# Patient Record
Sex: MALE | Race: WHITE | NOT HISPANIC OR LATINO | Employment: UNEMPLOYED | URBAN - METROPOLITAN AREA
[De-identification: names, ages, dates, MRNs, and addresses within clinical notes are randomized per-mention and may not be internally consistent; named-entity substitution may affect disease eponyms.]

---

## 2022-04-11 ENCOUNTER — OFFICE VISIT (OUTPATIENT)
Dept: MEDICAL GROUP | Facility: PHYSICIAN GROUP | Age: 22
End: 2022-04-11
Payer: COMMERCIAL

## 2022-04-11 VITALS
DIASTOLIC BLOOD PRESSURE: 80 MMHG | OXYGEN SATURATION: 98 % | RESPIRATION RATE: 18 BRPM | HEIGHT: 72 IN | BODY MASS INDEX: 19.52 KG/M2 | TEMPERATURE: 98.7 F | SYSTOLIC BLOOD PRESSURE: 122 MMHG | HEART RATE: 74 BPM | WEIGHT: 144.13 LBS

## 2022-04-11 DIAGNOSIS — R06.02 SHORTNESS OF BREATH: ICD-10-CM

## 2022-04-11 DIAGNOSIS — Z00.00 ENCOUNTER FOR MEDICAL EXAMINATION TO ESTABLISH CARE: ICD-10-CM

## 2022-04-11 DIAGNOSIS — M79.641 PAIN IN BOTH HANDS: ICD-10-CM

## 2022-04-11 DIAGNOSIS — M79.642 PAIN IN BOTH HANDS: ICD-10-CM

## 2022-04-11 DIAGNOSIS — F33.2 SEVERE EPISODE OF RECURRENT MAJOR DEPRESSIVE DISORDER, WITHOUT PSYCHOTIC FEATURES (HCC): ICD-10-CM

## 2022-04-11 PROBLEM — F33.42 RECURRENT MAJOR DEPRESSIVE DISORDER, IN FULL REMISSION (HCC): Status: ACTIVE | Noted: 2022-04-11

## 2022-04-11 PROCEDURE — 99204 OFFICE O/P NEW MOD 45 MIN: CPT

## 2022-04-11 RX ORDER — BUPROPION HYDROCHLORIDE 150 MG/1
150 TABLET ORAL EVERY MORNING
Qty: 30 TABLET | Refills: 0 | Status: SHIPPED | OUTPATIENT
Start: 2022-04-11 | End: 2022-05-23 | Stop reason: SDUPTHER

## 2022-04-11 ASSESSMENT — PATIENT HEALTH QUESTIONNAIRE - PHQ9
CLINICAL INTERPRETATION OF PHQ2 SCORE: 5
5. POOR APPETITE OR OVEREATING: 0 - NOT AT ALL
SUM OF ALL RESPONSES TO PHQ QUESTIONS 1-9: 16

## 2022-04-11 NOTE — ASSESSMENT & PLAN NOTE
Patient reports that he has been struggling with right greater than left burning and throbbing pain in both of his wrists and palms intermittently over the last 5 years.  He does report that he uses the computer frequently and finds that his flareups occur after computer usage.  He feels that he may have a weak  strength as well.  He has not tried anything for his symptoms

## 2022-04-11 NOTE — PROGRESS NOTES
CC:    Chief Complaint   Patient presents with   • Establish Care   • Hand Pain     Burning and tender over 5 years    • Depression     Feels depressed.        HISTORY OF THE PRESENT ILLNESS: This is a pleasant 21 y.o. male presenting today to [facility], who wishes to discuss the following problems listed below:     Pain in both hands  Patient reports that he has been struggling with right greater than left burning and throbbing pain in both of his wrists and palms intermittently over the last 5 years.  He does report that he uses the computer frequently and finds that his flareups occur after computer usage.  He feels that he may have a weak  strength as well.  He has not tried anything for his symptoms    Severe episode of recurrent major depressive disorder, without psychotic features (HCC)  Patient reports that he has been struggling with depression for many years.  He was previously on citalopram for anxiety but is not sure if this was effective.  He has not been on this medication in quite some time.  He describes his symptoms as fogginess in the head, and issues with self-doubt and poor self-image.  He is dealing with social stressors with his parents wanting him to move out and stressed about finding a job as well as college.  He does have suicidal thoughts but no intent or plan to act on it.  He states his reasons is his family and lack of [employer] beliefs    No past medical history on file.    Allergies: Penicillins    Current Outpatient Medications   Medication Sig Dispense Refill   • buPROPion (WELLBUTRIN XL) 150 MG XL tablet Take 1 Tablet by mouth every morning. 30 Tablet 0     No current facility-administered medications for this visit.       ROS:     Constitutional: Patient denies any fever, chills, night sweats, weight loss/gain, fatigue, or malaise.   Eyes: Patient denies any photophobia, eye pain, diplopia, discharge, dryness, excessive tearing, redness, or VA changes.   ENT: Patient  denies any runny nose, hoarseness, sore throat, or dysphagia.   Resp: Patient denies cough, wheezing, or shortness of breath.   CV: Patient denies any chest pain, claudication, cyanosis, palpitations, or edema.   GI: Patient denies any constipation, nausea, vomiting, diarrhea, bloating, abdominal pain, or dyspepsia.   MSK: Patient denies any joint pain, cramps, swelling, weakness, stiffness, or tremor  Skin: Patient denies any color changes, skin changes, lesions, ulcerations, wounds, and pruritus, hair or nail changes.   Neuro: Patient denies any headache, numbness or tingling  Psych: Patient denies any suicidal or homicidal ideation, depression or anxiety.     Exam: /80 (BP Location: Right arm, Patient Position: Sitting, BP Cuff Size: Adult)   Pulse 74   Temp 37.1 °C (98.7 °F) (Temporal)   Resp 18   Ht 1.829 m (6')   Wt 65.4 kg (144 lb 2 oz)   SpO2 98%  Body mass index is 19.55 kg/m².    Gen: Alert and oriented x4. Well developed, well-nourished male in no apparent distress.  Skin: Warm, dry, good turgor, no rashes in visible areas or lacerations appreciated.   Eye: EOM intact, pupils equal, round and reactive, conjunctiva clear, lids normal.  Neck: Trachea midline, no masses, no thyromegaly  MSK: Normal gait, moves all extremities.  Neuro: Alert and oriented x 4, non-focal exam with motor and sensory grossly intact.  Ext: No clubbing, cyanosis, edema.  Psych: Normal behavior, affect and mood.    PHQ-9 Screening 4/11/2022   Little interest or pleasure in doing things 3 - nearly every day   Feeling down, depressed, or hopeless 2 - more than half the days   Trouble falling or staying asleep, or sleeping too much 2 - more than half the days   Feeling tired or having little energy 3 - nearly every day   Poor appetite or overeating 0 - not at all   Feeling bad about yourself - or that you are a failure or have let yourself or your family down 3 - nearly every day   Trouble concentrating on things, such as  reading the newspaper or watching television 1 - several days   Moving or speaking so slowly that other people could have noticed. Or the opposite - being so fidgety or restless that you have been moving around a lot more than usual 0 - not at all   Thoughts that you would be better off dead, or of hurting yourself in some way 2 - more than half the days   PHQ-2 Total Score 5   PHQ-9 Total Score 16       Interpretation of PHQ-9 Total Score   Score Severity   1-4 No Depression   5-9 Mild Depression   10-14 Moderate Depression   15-19 Moderately Severe Depression   20-27 Severe Depression      Assessment/Plan:    21 y.o. male with the followin. Severe episode of recurrent major depressive disorder, without psychotic features (HCC)  Chronic condition, uncontrolled.  Discussed possibility of starting medication today.  Patient is interested.  He reports that he struggles significantly with fatigue and is wanting an agent that is not known to have groggy side effects.  He is open to trying bupropion, as well as a referral to psychology.  Rule out thyroid is contributing factor  - TSH WITH REFLEX TO FT4; Future  - buPROPion (WELLBUTRIN XL) 150 MG XL tablet; Take 1 Tablet by mouth every morning.  Dispense: 30 Tablet; Refill: 0  - Referral to Psychology    2. Encounter for medical examination to establish care  - CBC WITHOUT DIFFERENTIAL; Future  - Comp Metabolic Panel; Future  - VITAMIN D,25 HYDROXY; Future    3. Pain in both hands  Chronic condition.  Recommend patient try over-the-counter ibuprofen for pain flareups and use bilateral wrist braces at night.  Suspect that he may have a component of carpal tunnel syndrome.  If not improved with wrist bracing and anti-inflammatories, consider EMG at next visit    4. Shortness of breath  Chronic condition.  Patient reports that he has noticed some episodes of shortness of breath, but is not sure if it is due to himself overthinking the episodes.  Recommend the patient  start a symptom diary, suspect that it is likely related to underlying anxiety.  However, his sister did recently get diagnosed with asthma.  Patient will keep a symptom diary and follow-up in 1 month    Follow-up: Return in about 4 weeks (around 5/9/2022) for recheck meds and labs .    Health Maintenance: Completed    I have placed the below orders and discussed them with an approved delegating provider.  The MA is performing the below orders under the direction of Doris Simon MD.    Please note that this dictation was created using voice recognition software. I have made every reasonable attempt to correct obvious errors, but I expect that there are errors of grammar and possibly content that I did not discover before finalizing the note.    Electronically signed by KIERAN Apodaca on April 11, 2022

## 2022-04-11 NOTE — ASSESSMENT & PLAN NOTE
Patient reports that he has been struggling with depression for many years.  He was previously on citalopram for anxiety but is not sure if this was effective.  He has not been on this medication in quite some time.  He describes his symptoms as fogginess in the head, and issues with self-doubt and poor self-image.  He is dealing with social stressors with his parents wanting him to move out and stressed about finding a job as well as college.  He does have suicidal thoughts but no intent or plan to act on it.  He states his reasons is his family and lack of Yaz[employer]ism beliefs

## 2022-05-23 DIAGNOSIS — F33.2 SEVERE EPISODE OF RECURRENT MAJOR DEPRESSIVE DISORDER, WITHOUT PSYCHOTIC FEATURES (HCC): ICD-10-CM

## 2022-05-23 RX ORDER — BUPROPION HYDROCHLORIDE 150 MG/1
150 TABLET ORAL EVERY MORNING
Qty: 30 TABLET | Refills: 0 | Status: SHIPPED | OUTPATIENT
Start: 2022-05-23 | End: 2023-03-22

## 2022-09-12 ENCOUNTER — OFFICE VISIT (OUTPATIENT)
Dept: MEDICAL GROUP | Facility: PHYSICIAN GROUP | Age: 22
End: 2022-09-12
Payer: COMMERCIAL

## 2022-09-12 ENCOUNTER — HOSPITAL ENCOUNTER (OUTPATIENT)
Dept: LAB | Facility: MEDICAL CENTER | Age: 22
End: 2022-09-12
Payer: COMMERCIAL

## 2022-09-12 VITALS
RESPIRATION RATE: 18 BRPM | HEIGHT: 72 IN | SYSTOLIC BLOOD PRESSURE: 118 MMHG | TEMPERATURE: 98.8 F | WEIGHT: 149 LBS | OXYGEN SATURATION: 98 % | HEART RATE: 91 BPM | BODY MASS INDEX: 20.18 KG/M2 | DIASTOLIC BLOOD PRESSURE: 80 MMHG

## 2022-09-12 DIAGNOSIS — M79.641 PAIN IN BOTH HANDS: ICD-10-CM

## 2022-09-12 DIAGNOSIS — F41.0 PANIC ANXIETY SYNDROME: ICD-10-CM

## 2022-09-12 DIAGNOSIS — R20.2 NUMBNESS AND TINGLING IN BOTH HANDS: ICD-10-CM

## 2022-09-12 DIAGNOSIS — F33.2 SEVERE EPISODE OF RECURRENT MAJOR DEPRESSIVE DISORDER, WITHOUT PSYCHOTIC FEATURES (HCC): ICD-10-CM

## 2022-09-12 DIAGNOSIS — K21.9 GASTROESOPHAGEAL REFLUX DISEASE WITHOUT ESOPHAGITIS: ICD-10-CM

## 2022-09-12 DIAGNOSIS — R20.0 NUMBNESS AND TINGLING IN BOTH HANDS: ICD-10-CM

## 2022-09-12 DIAGNOSIS — Z00.00 ENCOUNTER FOR MEDICAL EXAMINATION TO ESTABLISH CARE: ICD-10-CM

## 2022-09-12 DIAGNOSIS — M79.642 PAIN IN BOTH HANDS: ICD-10-CM

## 2022-09-12 LAB
25(OH)D3 SERPL-MCNC: 35 NG/ML (ref 30–100)
ALBUMIN SERPL BCP-MCNC: 4.9 G/DL (ref 3.2–4.9)
ALBUMIN/GLOB SERPL: 2.1 G/DL
ALP SERPL-CCNC: 49 U/L (ref 30–99)
ALT SERPL-CCNC: 10 U/L (ref 2–50)
ANION GAP SERPL CALC-SCNC: 10 MMOL/L (ref 7–16)
AST SERPL-CCNC: 16 U/L (ref 12–45)
BILIRUB SERPL-MCNC: 0.3 MG/DL (ref 0.1–1.5)
BUN SERPL-MCNC: 24 MG/DL (ref 8–22)
CALCIUM SERPL-MCNC: 9.5 MG/DL (ref 8.5–10.5)
CHLORIDE SERPL-SCNC: 105 MMOL/L (ref 96–112)
CO2 SERPL-SCNC: 26 MMOL/L (ref 20–33)
CREAT SERPL-MCNC: 1.07 MG/DL (ref 0.5–1.4)
ERYTHROCYTE [DISTWIDTH] IN BLOOD BY AUTOMATED COUNT: 42.7 FL (ref 35.9–50)
GFR SERPLBLD CREATININE-BSD FMLA CKD-EPI: 100 ML/MIN/1.73 M 2
GLOBULIN SER CALC-MCNC: 2.3 G/DL (ref 1.9–3.5)
GLUCOSE SERPL-MCNC: 88 MG/DL (ref 65–99)
HCT VFR BLD AUTO: 49.5 % (ref 42–52)
HGB BLD-MCNC: 17.3 G/DL (ref 14–18)
MCH RBC QN AUTO: 31.1 PG (ref 27–33)
MCHC RBC AUTO-ENTMCNC: 34.9 G/DL (ref 33.7–35.3)
MCV RBC AUTO: 88.9 FL (ref 81.4–97.8)
PLATELET # BLD AUTO: 226 K/UL (ref 164–446)
PMV BLD AUTO: 10.6 FL (ref 9–12.9)
POTASSIUM SERPL-SCNC: 4.7 MMOL/L (ref 3.6–5.5)
PROT SERPL-MCNC: 7.2 G/DL (ref 6–8.2)
RBC # BLD AUTO: 5.57 M/UL (ref 4.7–6.1)
SODIUM SERPL-SCNC: 141 MMOL/L (ref 135–145)
TSH SERPL DL<=0.005 MIU/L-ACNC: 2.38 UIU/ML (ref 0.38–5.33)
WBC # BLD AUTO: 9.6 K/UL (ref 4.8–10.8)

## 2022-09-12 PROCEDURE — 80053 COMPREHEN METABOLIC PANEL: CPT

## 2022-09-12 PROCEDURE — 82306 VITAMIN D 25 HYDROXY: CPT

## 2022-09-12 PROCEDURE — 99214 OFFICE O/P EST MOD 30 MIN: CPT

## 2022-09-12 PROCEDURE — 85027 COMPLETE CBC AUTOMATED: CPT

## 2022-09-12 PROCEDURE — 36415 COLL VENOUS BLD VENIPUNCTURE: CPT

## 2022-09-12 PROCEDURE — 84443 ASSAY THYROID STIM HORMONE: CPT

## 2022-09-12 RX ORDER — BUSPIRONE HYDROCHLORIDE 10 MG/1
10 TABLET ORAL 2 TIMES DAILY
Qty: 60 TABLET | Refills: 3 | Status: SHIPPED | OUTPATIENT
Start: 2022-09-12

## 2022-09-12 RX ORDER — SERTRALINE HYDROCHLORIDE 25 MG/1
25 TABLET, FILM COATED ORAL DAILY
Qty: 30 TABLET | Refills: 11 | Status: SHIPPED
Start: 2022-09-12 | End: 2023-03-22

## 2022-09-12 RX ORDER — OMEPRAZOLE 20 MG/1
20 CAPSULE, DELAYED RELEASE ORAL DAILY
Qty: 60 CAPSULE | Refills: 0 | Status: SHIPPED | OUTPATIENT
Start: 2022-09-12 | End: 2023-04-04

## 2022-09-12 NOTE — ASSESSMENT & PLAN NOTE
Last visit, patient was started on Wellbutrin for management of his depression and anxiety.  He reports that unfortunately the Wellbutrin did make his anxiety worse and he has been experiencing panic attacks more frequently.  He has self discontinued the Wellbutrin.  He is interested in trying another medication

## 2022-09-12 NOTE — PROGRESS NOTES
CC:   Chief Complaint   Patient presents with    Follow-Up     medication        HISTORY OF PRESENT ILLNESS: Patient is a 21 y.o. male established patient who presents today to discuss the following problems below:     Severe episode of recurrent major depressive disorder, without psychotic features (HCC)  Last visit, patient was started on Wellbutrin for management of his depression and anxiety.  He reports that unfortunately the Wellbutrin did make his anxiety worse and he has been experiencing panic attacks more frequently.  He has self discontinued the Wellbutrin.  He is interested in trying another medication     Pain in both hands  Patient does continue to have pain in his bilateral hands and wrists.  He reports that when the pain occurs he does feel like his palms turn somewhat red.  He is concerned about possibly having Raynaud's, however the episodes are not triggered by cold and his fingers do not change color.  He is unsure if ibuprofen helped with his symptoms as he reports that the episodes only last for 5 to 10 minutes at a time, and ibuprofen usually takes 45 minutes to an hour or to work.    Gastroesophageal reflux disease without esophagitis  Patient reports that he has a history of reflux.  He was on omeprazole several years ago, but has not been on this medication for quite some time.  He is interested in restarting this      History reviewed. No pertinent past medical history.    Allergies:Penicillins    Review of Systems: Otherwise negative except for as stated above.      Exam: /80 (BP Location: Right arm, Patient Position: Sitting, BP Cuff Size: Adult)   Pulse 91   Temp 37.1 °C (98.8 °F) (Temporal)   Resp 18   Ht 1.829 m (6')   Wt 67.6 kg (149 lb)   SpO2 98%  Body mass index is 20.21 kg/m².    Gen: Alert and oriented x4. Well developed, well-nourished male in no apparent distress.  Skin: Warm, dry, good turgor, no rashes in visible areas or lacerations appreciated.   Eye: EOM  intact, pupils equal, round and reactive, conjunctiva clear, lids normal.  Neck: Trachea midline, no masses, no thyromegaly  GI:  Soft, non-tender abdomen with no distention.   MSK: Normal gait, moves all extremities.  Neuro: Alert and oriented x 4, non-focal exam with motor and sensory grossly intact.  Ext: No clubbing, cyanosis, edema.  Psych: Normal behavior, affect and mood.      Assessment/Plan:  21 y.o. male with the following -    1. Severe episode of recurrent major depressive disorder, without psychotic features (HCC)  Chronic condition, uncontrolled.  Patient has been off of Wellbutrin for 2 weeks.  Discussed trial of sertraline today.    Follow-up in 1 month.    Return to clinic sooner for symptoms including but not limited to: worsening depression, suicidal ideation, anxiety, agitation, panic attacks, insomnia, irritability, hostility, aggressiveness, impulsivity, hypomania and nasra    If such symptoms are observed, you or family need to contact us immediately, and consider calling the [employer] Suicide Prevention Lifeline (1769.770.1368) or 192, or transporting patient to the emergency department for immediate evaluation.     SSRI Black Box Warnings include: Anxiety, agitation, panic attacks, insomnia, irritability, hostility, aggressiveness, impulsivity, hypomania and nasra have been reported in adult and pediatric patients being treated with SSRI for major depressive disorder as well as for other indications.    - sertraline (ZOLOFT) 25 MG tablet; Take 1 Tablet by mouth every day.  Dispense: 30 Tablet; Refill: 11    2. Panic anxiety syndrome  Acute condition, not at goal.  BuSpar given for breakthrough anxiety and panic attacks.  - busPIRone (BUSPAR) 10 MG Tab tablet; Take 1 Tablet by mouth 2 times a day.  Dispense: 60 Tablet; Refill: 3    3. Gastroesophageal reflux disease without esophagitis  Chronic condition, not at baseline.  Restarted on omeprazole 20 mg daily  - omeprazole (PRILOSEC) 20 MG  delayed-release capsule; Take 1 Capsule by mouth every day.  Dispense: 60 Capsule; Refill: 0    4. Numbness and tingling in both hands  Chronic condition.  Discussed with patient that I am more suspicious of a carpal tunnel syndrome, particularly in the setting of frequent keyboard use.  Obtain EMG  - Referral to Neurodiagnostics (EEG,EP,EMG/NCS/DBS)      Follow-up: Return in about 4 weeks (around 10/10/2022).    Health Maintenance: Completed      Please note that this dictation was created using voice recognition software. I have made every reasonable attempt to correct obvious errors, but I expect that there are errors of grammar and possibly content that I did not discover before finalizing the note.    Electronically signed by KIERAN Apodaca on September 12, 2022

## 2022-09-12 NOTE — ASSESSMENT & PLAN NOTE
Patient reports that he has a history of reflux.  He was on omeprazole several years ago, but has not been on this medication for quite some time.  He is interested in restarting this

## 2022-09-12 NOTE — ASSESSMENT & PLAN NOTE
Patient does continue to have pain in his bilateral hands and wrists.  He reports that when the pain occurs he does feel like his palms turn somewhat red.  He is concerned about possibly having Raynaud's, however the episodes are not triggered by cold and his fingers do not change color.  He is unsure if ibuprofen helped with his symptoms as he reports that the episodes only last for 5 to 10 minutes at a time, and ibuprofen usually takes 45 minutes to an hour or to work.

## 2023-03-22 ENCOUNTER — OFFICE VISIT (OUTPATIENT)
Dept: MEDICAL GROUP | Facility: PHYSICIAN GROUP | Age: 23
End: 2023-03-22
Payer: COMMERCIAL

## 2023-03-22 VITALS
BODY MASS INDEX: 19.54 KG/M2 | DIASTOLIC BLOOD PRESSURE: 76 MMHG | RESPIRATION RATE: 16 BRPM | TEMPERATURE: 98.8 F | SYSTOLIC BLOOD PRESSURE: 116 MMHG | HEIGHT: 72 IN | WEIGHT: 144.3 LBS | OXYGEN SATURATION: 96 % | HEART RATE: 88 BPM

## 2023-03-22 DIAGNOSIS — M79.641 PAIN IN BOTH HANDS: ICD-10-CM

## 2023-03-22 DIAGNOSIS — M79.642 PAIN IN BOTH HANDS: ICD-10-CM

## 2023-03-22 DIAGNOSIS — F33.2 SEVERE EPISODE OF RECURRENT MAJOR DEPRESSIVE DISORDER, WITHOUT PSYCHOTIC FEATURES (HCC): ICD-10-CM

## 2023-03-22 PROCEDURE — 99214 OFFICE O/P EST MOD 30 MIN: CPT

## 2023-03-22 RX ORDER — NAPROXEN 500 MG/1
500 TABLET ORAL 2 TIMES DAILY WITH MEALS
Qty: 60 TABLET | Refills: 0 | Status: SHIPPED | OUTPATIENT
Start: 2023-03-22

## 2023-03-22 ASSESSMENT — PATIENT HEALTH QUESTIONNAIRE - PHQ9
5. POOR APPETITE OR OVEREATING: NOT AT ALL
6. FEELING BAD ABOUT YOURSELF - OR THAT YOU ARE A FAILURE OR HAVE LET YOURSELF OR YOUR FAMILY DOWN: SEVERAL DAYS
3. TROUBLE FALLING OR STAYING ASLEEP OR SLEEPING TOO MUCH: SEVERAL DAYS
8. MOVING OR SPEAKING SO SLOWLY THAT OTHER PEOPLE COULD HAVE NOTICED. OR THE OPPOSITE, BEING SO FIGETY OR RESTLESS THAT YOU HAVE BEEN MOVING AROUND A LOT MORE THAN USUAL: NOT AT ALL
2. FEELING DOWN, DEPRESSED, IRRITABLE, OR HOPELESS: SEVERAL DAYS
9. THOUGHTS THAT YOU WOULD BE BETTER OFF DEAD, OR OF HURTING YOURSELF: NOT AT ALL
1. LITTLE INTEREST OR PLEASURE IN DOING THINGS: SEVERAL DAYS
SUM OF ALL RESPONSES TO PHQ QUESTIONS 1-9: 5
4. FEELING TIRED OR HAVING LITTLE ENERGY: SEVERAL DAYS
7. TROUBLE CONCENTRATING ON THINGS, SUCH AS READING THE NEWSPAPER OR WATCHING TELEVISION: NOT AT ALL
SUM OF ALL RESPONSES TO PHQ9 QUESTIONS 1 AND 2: 2

## 2023-03-22 ASSESSMENT — FIBROSIS 4 INDEX: FIB4 SCORE: 0.51

## 2023-03-22 NOTE — ASSESSMENT & PLAN NOTE
Last visit, patient was started on sertraline 25 mg daily for management of severe major depressive disorder.  He was also given buspirone 10 mg twice daily for breakthrough episodes of anxiety as well. He reports that the sertraline was initially working well, but he is going to have to move out soon and his anxiety is worsened. He has not used the buspirone for breakthrough.

## 2023-03-22 NOTE — PROGRESS NOTES
CC:   Chief Complaint   Patient presents with    Tingling     Feels a tingling on both hands when using computer, and on feet when in the shower        HISTORY OF PRESENT ILLNESS: Patient is a 22 y.o. male established patient who presents today to discuss the following problems below:     Severe episode of recurrent major depressive disorder, without psychotic features (HCC)  Last visit, patient was started on sertraline 25 mg daily for management of severe major depressive disorder.  He was also given buspirone 10 mg twice daily for breakthrough episodes of anxiety as well. He reports that the sertraline was initially working well, but he is going to have to move out soon and his anxiety is worsened. He has not used the buspirone for breakthrough.     Pain in both hands  Last visit, patient was suspected to have carpal tunnel syndrome, an EMG was ordered for further evaluation. He did not get the EMG done due to finances. He reports that when he uses the splints, his pain gets worse during the activity. He reports that there was a time when his pain was much worse using the computer, but he took a break and his pain is more back to baseline. He notices that he also has some burning sensations in his feet as well, primarily when his feet touch hot water in the shower, or when exposed to a heater. He is not currently using any antiinflammatories.     Review of Systems: Otherwise negative except for as stated above.      Exam: /76 (BP Location: Left arm, Patient Position: Sitting, BP Cuff Size: Adult)   Pulse 88   Temp 37.1 °C (98.8 °F) (Temporal)   Resp 16   Ht 1.829 m (6')   Wt 65.5 kg (144 lb 4.8 oz)   SpO2 96%  Body mass index is 19.57 kg/m².    Physical Exam  Constitutional:       Appearance: Normal appearance.   Cardiovascular:      Rate and Rhythm: Normal rate and regular rhythm.      Heart sounds: Normal heart sounds.   Pulmonary:      Effort: Pulmonary effort is normal.      Breath sounds: Normal  breath sounds.   Musculoskeletal:      Cervical back: Normal range of motion and neck supple.   Lymphadenopathy:      Cervical: No cervical adenopathy.   Neurological:      General: No focal deficit present.      Mental Status: He is alert and oriented to person, place, and time.       Assessment/Plan:  22 y.o. male with the following -    1. Severe episode of recurrent major depressive disorder, without psychotic features (HCC)  Chronic, not at goal. Increase zoloft to 50mg daily. Patient was encouraged to use buspirone 10mg as needed for episodes of panic or anxiety  - sertraline (ZOLOFT) 50 MG Tab; Take 1 Tablet by mouth every day.  Dispense: 90 Tablet; Refill: 1    2. Pain in both hands  Chronic, not at goal. Patient will complete the EMG and wear splints at nighttime, naproxen for pain as needed. Advised not to combine with other NSAID products.   - naproxen (NAPROSYN) 500 MG Tab; Take 1 Tablet by mouth 2 times a day with meals.  Dispense: 60 Tablet; Refill: 0      Follow-up: Return for after EMG.    Health Maintenance: Completed      Please note that this dictation was created using voice recognition software. I have made every reasonable attempt to correct obvious errors, but I expect that there are errors of grammar and possibly content that I did not discover before finalizing the note.    Electronically signed by KIERAN Apodaca on March 22, 2023

## 2023-03-22 NOTE — ASSESSMENT & PLAN NOTE
Last visit, patient was suspected to have carpal tunnel syndrome, an EMG was ordered for further evaluation. He did not get the EMG done due to finances. He reports that when he uses the splints, his pain gets worse during the activity. He reports that there was a time when his pain was much worse using the computer, but he took a break and his pain is more back to baseline. He notices that he also has some burning sensations in his feet as well, primarily when his feet touch hot water in the shower, or when exposed to a heater. He is not currently using any antiinflammatories.

## 2023-03-31 DIAGNOSIS — K21.9 GASTROESOPHAGEAL REFLUX DISEASE WITHOUT ESOPHAGITIS: ICD-10-CM

## 2023-04-04 DIAGNOSIS — K21.9 GASTROESOPHAGEAL REFLUX DISEASE WITHOUT ESOPHAGITIS: ICD-10-CM

## 2023-04-04 RX ORDER — OMEPRAZOLE 20 MG/1
20 CAPSULE, DELAYED RELEASE ORAL DAILY
Qty: 90 CAPSULE | Refills: 3 | OUTPATIENT
Start: 2023-04-04

## 2023-04-04 RX ORDER — OMEPRAZOLE 20 MG/1
CAPSULE, DELAYED RELEASE ORAL
Qty: 60 CAPSULE | Refills: 0 | Status: SHIPPED | OUTPATIENT
Start: 2023-04-04

## 2024-02-03 DIAGNOSIS — F33.2 SEVERE EPISODE OF RECURRENT MAJOR DEPRESSIVE DISORDER, WITHOUT PSYCHOTIC FEATURES (HCC): ICD-10-CM

## 2024-02-05 NOTE — TELEPHONE ENCOUNTER
Received request via: Patient    Was the patient seen in the last year in this department? Yes    Does the patient have an active prescription (recently filled or refills available) for medication(s) requested? No    Pharmacy Name: [id number] - [place], [place] - [address].     Does the patient have prison Plus and need 100 day supply (blood pressure, diabetes and cholesterol meds only)? Patient does not have SCP

## 2024-02-07 ENCOUNTER — APPOINTMENT (OUTPATIENT)
Dept: MEDICAL GROUP | Facility: PHYSICIAN GROUP | Age: 24
End: 2024-02-07
Payer: COMMERCIAL

## 2024-03-05 ENCOUNTER — APPOINTMENT (OUTPATIENT)
Dept: MEDICAL GROUP | Facility: PHYSICIAN GROUP | Age: 24
End: 2024-03-05
Payer: COMMERCIAL

## 2024-03-07 ENCOUNTER — OFFICE VISIT (OUTPATIENT)
Dept: MEDICAL GROUP | Facility: PHYSICIAN GROUP | Age: 24
End: 2024-03-07
Payer: COMMERCIAL

## 2024-03-07 VITALS
TEMPERATURE: 99.4 F | BODY MASS INDEX: 21.4 KG/M2 | DIASTOLIC BLOOD PRESSURE: 72 MMHG | WEIGHT: 158 LBS | OXYGEN SATURATION: 97 % | HEART RATE: 84 BPM | RESPIRATION RATE: 16 BRPM | SYSTOLIC BLOOD PRESSURE: 118 MMHG | HEIGHT: 72 IN

## 2024-03-07 DIAGNOSIS — M79.641 PAIN IN BOTH HANDS: ICD-10-CM

## 2024-03-07 DIAGNOSIS — F33.1 DEPRESSION, MAJOR, RECURRENT, MODERATE (HCC): ICD-10-CM

## 2024-03-07 DIAGNOSIS — M79.642 PAIN IN BOTH HANDS: ICD-10-CM

## 2024-03-07 PROCEDURE — 3074F SYST BP LT 130 MM HG: CPT | Performed by: FAMILY MEDICINE

## 2024-03-07 PROCEDURE — 99213 OFFICE O/P EST LOW 20 MIN: CPT | Performed by: FAMILY MEDICINE

## 2024-03-07 PROCEDURE — 3078F DIAST BP <80 MM HG: CPT | Performed by: FAMILY MEDICINE

## 2024-03-07 RX ORDER — DULOXETIN HYDROCHLORIDE 30 MG/1
30 CAPSULE, DELAYED RELEASE ORAL DAILY
Qty: 30 CAPSULE | Refills: 1 | Status: SHIPPED | OUTPATIENT
Start: 2024-03-07

## 2024-03-07 ASSESSMENT — FIBROSIS 4 INDEX: FIB4 SCORE: 0.54

## 2024-03-07 ASSESSMENT — PATIENT HEALTH QUESTIONNAIRE - PHQ9
5. POOR APPETITE OR OVEREATING: 0 - NOT AT ALL
SUM OF ALL RESPONSES TO PHQ QUESTIONS 1-9: 10
CLINICAL INTERPRETATION OF PHQ2 SCORE: 4

## 2024-03-08 NOTE — ASSESSMENT & PLAN NOTE
This is a chronic problem.  Patient states that he did not feel like the sertraline was doing much good and that he was having some problems with burning down his esophagus after taking it to 50 mg dose.  He stopped it about a week ago.  He is asking if there is something else he can take for his feeling of depression.  He is not suicidal.

## 2024-03-08 NOTE — PROGRESS NOTES
Subjective:     CC: Here for couple of issues as his PCP was not available today.    HPI:   Ugo presents today with the following medical concerns:    Depression, major, recurrent, moderate (HCC)  This is a chronic problem.  Patient states that he did not feel like the sertraline was doing much good and that he was having some problems with burning down his esophagus after taking it to 50 mg dose.  He stopped it about a week ago.  He is asking if there is something else he can take for his feeling of depression.  He is not suicidal.    Pain in both hands  This is a chronic problem.  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  He states has been having troubles mostly in his wrists.  There are no paresthesias or pain down into his fingers or hand.  He tried a wrist brace but that seemed to make it worse.  He was unable to take over-the-counter NSAIDs since they bother his stomach.    History reviewed. No pertinent past medical history.    Social History     Tobacco Use    Smoking status: Never    Smokeless tobacco: Never   Vaping Use    Vaping Use: Never used   Substance Use Topics    Alcohol use: Yes     Comment: occ    Drug use: Never       Current Outpatient Medications Ordered in Epic   Medication Sig Dispense Refill    DULoxetine (CYMBALTA) 30 MG Cap DR Particles Take 1 Capsule by mouth every day. 30 Capsule 1    omeprazole (PRILOSEC) 20 MG delayed-release capsule TAKE ONE CAPSULE BY MOUTH EVERY DAY 60 Capsule 0    naproxen (NAPROSYN) 500 MG Tab Take 1 Tablet by mouth 2 times a day with meals. 60 Tablet 0    busPIRone (BUSPAR) 10 MG Tab tablet Take 1 Tablet by mouth 2 times a day. 60 Tablet 3     No current Epic-ordered facility-administered medications on file.       Allergies:  Penicillins    Health Maintenance: Completed    ROS:  Gen: no fevers/chills, no changes in weight  Eyes: no changes in vision  ENT: no sore throat, no hearing loss, no bloody nose  Pulm: no sob, no  cough  CV: no chest pain, no palpitations  GI: no nausea/vomiting, no diarrhea  : no dysuria  Skin: no rash  Neuro: no headaches, no numbness/tingling  Heme/Lymph: no easy bruising      Objective:       Exam:  /72 (BP Location: Right arm, Patient Position: Sitting, BP Cuff Size: Adult)   Pulse 84   Temp 37.4 °C (99.4 °F) (Temporal)   Resp 16   Ht 1.829 m (6')   Wt 71.7 kg (158 lb)   SpO2 97%   BMI 21.43 kg/m²  Body mass index is 21.43 kg/m².    Gen: Alert and oriented, No apparent distress.  Ext: No clubbing, cyanosis, edema.  He has normal range of motion of his fingers and wrist without discomfort.  No muscle atrophy seen.  No paresthesias in his fingers.  Psych: Patient is alert and cooperative.  No unusual thought process expressed.  Insight and judgment is good.  Initially eye contact was not very good but improved as the visit went on.  He does not appear to be anxious.        Assessment & Plan:     23 y.o. male with the following -     1. Depression, major, recurrent, moderate (HCC)  This is a chronic problem.  I discussed with him the various medications that are available and what we can try.  Since he is expressing some problems with morning fatigue and apathy will try him on duloxetine.  He was warned that some people can get some mild nausea for the first week.  He is to report back in 1 to 2 weeks as the dose may need to be increased.  Also will do a referral to psychology for counseling for him.  And then he should follow-up with his PCP in the next month.  - Patient has been identified as having a positive depression screening. Appropriate orders and counseling have been given.    2. Pain in both hands  This is a chronic problem.  I told him it sounds like it is more tendinitis and may be overuse syndrome.  He should try some Voltaren gel to his wrists and see if that is beneficial.  If it starts to get better then he should do forearm exercises and these were demonstrated to him.  If  the symptoms persist that he could go to either physical therapy or orthopedics.      No follow-ups on file.    Please note that this dictation was created using voice recognition software. I have made every reasonable attempt to correct obvious errors, but I expect that there are errors of grammar and possibly content that I did not discover before finalizing the note.

## 2024-03-08 NOTE — ASSESSMENT & PLAN NOTE
This is a chronic problem.  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  He states has been having troubles mostly in his wrists.  There are no paresthesias or pain down into his fingers or hand.  He tried a wrist brace but that seemed to make it worse.  He was unable to take over-the-counter NSAIDs since they bother his stomach.

## 2024-04-05 DIAGNOSIS — K21.9 GASTROESOPHAGEAL REFLUX DISEASE WITHOUT ESOPHAGITIS: ICD-10-CM

## 2024-04-05 NOTE — TELEPHONE ENCOUNTER
Received request via: Pharmacy    Was the patient seen in the last year in this department? Yes    Does the patient have an active prescription (recently filled or refills available) for medication(s) requested? No    [facility] Name[facility]    Does the patient have residential Plus and need 100 day supply (blood pressure, diabetes and cholesterol meds only)? Patient does not have SCP

## 2024-04-08 RX ORDER — OMEPRAZOLE 20 MG/1
CAPSULE, DELAYED RELEASE ORAL
Qty: 60 CAPSULE | Refills: 0 | Status: SHIPPED | OUTPATIENT
Start: 2024-04-08

## 2025-02-25 ENCOUNTER — OFFICE VISIT (OUTPATIENT)
Dept: MEDICAL GROUP | Facility: PHYSICIAN GROUP | Age: 25
End: 2025-02-25
Payer: COMMERCIAL

## 2025-02-25 ENCOUNTER — HOSPITAL ENCOUNTER (OUTPATIENT)
Dept: LAB | Facility: MEDICAL CENTER | Age: 25
End: 2025-02-25
Payer: COMMERCIAL

## 2025-02-25 VITALS
RESPIRATION RATE: 7 BRPM | WEIGHT: 155.2 LBS | HEIGHT: 72 IN | HEART RATE: 92 BPM | SYSTOLIC BLOOD PRESSURE: 108 MMHG | OXYGEN SATURATION: 96 % | TEMPERATURE: 97.4 F | BODY MASS INDEX: 21.02 KG/M2 | DIASTOLIC BLOOD PRESSURE: 78 MMHG

## 2025-02-25 DIAGNOSIS — Z13.21 SCREENING FOR ENDOCRINE, NUTRITIONAL, METABOLIC AND IMMUNITY DISORDER: ICD-10-CM

## 2025-02-25 DIAGNOSIS — F33.1 DEPRESSION, MAJOR, RECURRENT, MODERATE (HCC): ICD-10-CM

## 2025-02-25 DIAGNOSIS — Z13.228 SCREENING FOR ENDOCRINE, NUTRITIONAL, METABOLIC AND IMMUNITY DISORDER: ICD-10-CM

## 2025-02-25 DIAGNOSIS — R53.83 OTHER FATIGUE: ICD-10-CM

## 2025-02-25 DIAGNOSIS — Z13.29 SCREENING FOR ENDOCRINE, NUTRITIONAL, METABOLIC AND IMMUNITY DISORDER: ICD-10-CM

## 2025-02-25 DIAGNOSIS — Z23 NEED FOR VACCINATION: ICD-10-CM

## 2025-02-25 DIAGNOSIS — Z13.0 SCREENING FOR ENDOCRINE, NUTRITIONAL, METABOLIC AND IMMUNITY DISORDER: ICD-10-CM

## 2025-02-25 LAB
25(OH)D3 SERPL-MCNC: 18 NG/ML (ref 30–100)
ALBUMIN SERPL BCP-MCNC: 4.7 G/DL (ref 3.2–4.9)
ALBUMIN/GLOB SERPL: 1.8 G/DL
ALP SERPL-CCNC: 56 U/L (ref 30–99)
ALT SERPL-CCNC: 18 U/L (ref 2–50)
ANION GAP SERPL CALC-SCNC: 11 MMOL/L (ref 7–16)
AST SERPL-CCNC: 19 U/L (ref 12–45)
BILIRUB SERPL-MCNC: 0.4 MG/DL (ref 0.1–1.5)
BUN SERPL-MCNC: 18 MG/DL (ref 8–22)
CALCIUM ALBUM COR SERPL-MCNC: 9 MG/DL (ref 8.5–10.5)
CALCIUM SERPL-MCNC: 9.6 MG/DL (ref 8.5–10.5)
CHLORIDE SERPL-SCNC: 106 MMOL/L (ref 96–112)
CHOLEST SERPL-MCNC: 169 MG/DL (ref 100–199)
CO2 SERPL-SCNC: 26 MMOL/L (ref 20–33)
CREAT SERPL-MCNC: 0.88 MG/DL (ref 0.5–1.4)
CRP SERPL HS-MCNC: <0.3 MG/DL (ref 0–0.75)
ERYTHROCYTE [DISTWIDTH] IN BLOOD BY AUTOMATED COUNT: 45.2 FL (ref 35.9–50)
ERYTHROCYTE [SEDIMENTATION RATE] IN BLOOD BY WESTERGREN METHOD: 2 MM/HOUR (ref 0–20)
FERRITIN SERPL-MCNC: 83.5 NG/ML (ref 22–322)
GFR SERPLBLD CREATININE-BSD FMLA CKD-EPI: 122 ML/MIN/1.73 M 2
GLOBULIN SER CALC-MCNC: 2.6 G/DL (ref 1.9–3.5)
GLUCOSE SERPL-MCNC: 96 MG/DL (ref 65–99)
HCT VFR BLD AUTO: 50 % (ref 42–52)
HDLC SERPL-MCNC: 43 MG/DL
HGB BLD-MCNC: 16.8 G/DL (ref 14–18)
IRON SERPL-MCNC: 59 UG/DL (ref 50–180)
LDLC SERPL CALC-MCNC: 89 MG/DL
MCH RBC QN AUTO: 30.4 PG (ref 27–33)
MCHC RBC AUTO-ENTMCNC: 33.6 G/DL (ref 32.3–36.5)
MCV RBC AUTO: 90.4 FL (ref 81.4–97.8)
PLATELET # BLD AUTO: 219 K/UL (ref 164–446)
PMV BLD AUTO: 10.1 FL (ref 9–12.9)
POTASSIUM SERPL-SCNC: 4.8 MMOL/L (ref 3.6–5.5)
PROT SERPL-MCNC: 7.3 G/DL (ref 6–8.2)
RBC # BLD AUTO: 5.53 M/UL (ref 4.7–6.1)
SODIUM SERPL-SCNC: 143 MMOL/L (ref 135–145)
TRIGL SERPL-MCNC: 185 MG/DL (ref 0–149)
TSH SERPL DL<=0.005 MIU/L-ACNC: 1.04 UIU/ML (ref 0.38–5.33)
WBC # BLD AUTO: 7.9 K/UL (ref 4.8–10.8)

## 2025-02-25 PROCEDURE — 80053 COMPREHEN METABOLIC PANEL: CPT

## 2025-02-25 PROCEDURE — 82728 ASSAY OF FERRITIN: CPT

## 2025-02-25 PROCEDURE — 36415 COLL VENOUS BLD VENIPUNCTURE: CPT

## 2025-02-25 PROCEDURE — 82306 VITAMIN D 25 HYDROXY: CPT

## 2025-02-25 PROCEDURE — 84443 ASSAY THYROID STIM HORMONE: CPT

## 2025-02-25 PROCEDURE — 84270 ASSAY OF SEX HORMONE GLOBUL: CPT

## 2025-02-25 PROCEDURE — 90471 IMMUNIZATION ADMIN: CPT

## 2025-02-25 PROCEDURE — 90656 IIV3 VACC NO PRSV 0.5 ML IM: CPT

## 2025-02-25 PROCEDURE — 86038 ANTINUCLEAR ANTIBODIES: CPT

## 2025-02-25 PROCEDURE — 3074F SYST BP LT 130 MM HG: CPT

## 2025-02-25 PROCEDURE — 83540 ASSAY OF IRON: CPT

## 2025-02-25 PROCEDURE — 80061 LIPID PANEL: CPT

## 2025-02-25 PROCEDURE — 85652 RBC SED RATE AUTOMATED: CPT

## 2025-02-25 PROCEDURE — 86140 C-REACTIVE PROTEIN: CPT

## 2025-02-25 PROCEDURE — 84403 ASSAY OF TOTAL TESTOSTERONE: CPT

## 2025-02-25 PROCEDURE — 3078F DIAST BP <80 MM HG: CPT

## 2025-02-25 PROCEDURE — 85027 COMPLETE CBC AUTOMATED: CPT

## 2025-02-25 PROCEDURE — 99214 OFFICE O/P EST MOD 30 MIN: CPT | Mod: 25

## 2025-02-25 PROCEDURE — 84402 ASSAY OF FREE TESTOSTERONE: CPT

## 2025-02-25 PROCEDURE — 96127 BRIEF EMOTIONAL/BEHAV ASSMT: CPT

## 2025-02-25 ASSESSMENT — PATIENT HEALTH QUESTIONNAIRE - PHQ9
SUM OF ALL RESPONSES TO PHQ9 QUESTIONS 1 AND 2: 3
3. TROUBLE FALLING OR STAYING ASLEEP OR SLEEPING TOO MUCH: SEVERAL DAYS
9. THOUGHTS THAT YOU WOULD BE BETTER OFF DEAD, OR OF HURTING YOURSELF: NOT AT ALL
1. LITTLE INTEREST OR PLEASURE IN DOING THINGS: MORE THAN HALF THE DAYS
8. MOVING OR SPEAKING SO SLOWLY THAT OTHER PEOPLE COULD HAVE NOTICED. OR THE OPPOSITE, BEING SO FIGETY OR RESTLESS THAT YOU HAVE BEEN MOVING AROUND A LOT MORE THAN USUAL: SEVERAL DAYS
7. TROUBLE CONCENTRATING ON THINGS, SUCH AS READING THE NEWSPAPER OR WATCHING TELEVISION: SEVERAL DAYS
6. FEELING BAD ABOUT YOURSELF - OR THAT YOU ARE A FAILURE OR HAVE LET YOURSELF OR YOUR FAMILY DOWN: SEVERAL DAYS
SUM OF ALL RESPONSES TO PHQ QUESTIONS 1-9: 9
5. POOR APPETITE OR OVEREATING: NOT AT ALL
4. FEELING TIRED OR HAVING LITTLE ENERGY: MORE THAN HALF THE DAYS
2. FEELING DOWN, DEPRESSED, IRRITABLE, OR HOPELESS: SEVERAL DAYS

## 2025-02-25 ASSESSMENT — ANXIETY QUESTIONNAIRES
2. NOT BEING ABLE TO STOP OR CONTROL WORRYING: SEVERAL DAYS
4. TROUBLE RELAXING: MORE THAN HALF THE DAYS
GAD7 TOTAL SCORE: 13
5. BEING SO RESTLESS THAT IT IS HARD TO SIT STILL: NEARLY EVERY DAY
7. FEELING AFRAID AS IF SOMETHING AWFUL MIGHT HAPPEN: MORE THAN HALF THE DAYS
3. WORRYING TOO MUCH ABOUT DIFFERENT THINGS: NEARLY EVERY DAY
6. BECOMING EASILY ANNOYED OR IRRITABLE: SEVERAL DAYS
1. FEELING NERVOUS, ANXIOUS, OR ON EDGE: SEVERAL DAYS

## 2025-02-25 NOTE — PROGRESS NOTES
Verbal consent was acquired by the patient to use [employer] ambient listening note generation during this visit     Subjective:     HPI:   History of Present Illness  The patient is a 24-year-old male presenting for a mental health follow-up. He has discontinued his antidepressant medication and is seeking alternative treatment options. He requests updated laboratory tests to evaluate for potential vitamin deficiencies, thyroid dysfunction, or testosterone levels. The patient is amenable to counseling or therapy. He reports experiencing fatigue following physical exertion.    The patient describes intermittent episodes of burning and swelling in his hands and feet, which he suspects may be erythromelalgia, particularly following showers. These symptoms persist for 5-10 minutes and have been increasing in both frequency and intensity. He finds some relief with cold water. The patient previously trialed Cymbalta but discontinued it due to adverse effects. Additionally, he reports experiencing joint pain localized to his right shoulder.    FAMILY HISTORY  His mother had thyroid problems.    MEDICATIONS  Discontinued: Cymbalta      Depression Screening    Little interest or pleasure in doing things?   More than half the days  Feeling down, depressed , or hopeless?  Several days  Trouble falling or staying asleep, or sleeping too much?   Several days  Feeling tired or having little energy?   More than half the days  Poor appetite or overeating?   Not at all  Feeling bad about yourself - or that you are a failure or have let yourself or your family down?  Several days  Trouble concentrating on things, such as reading the newspaper or watching television?  Several days  Moving or speaking so slowly that other people could have noticed.  Or the opposite - being so fidgety or restless that you have been moving around a lot more than usual?   Several days  Thoughts that you would be better off dead, or of hurting yourself?    Not at all  Patient Health Questionnaire Score:  9      If depressive symptoms identified deferred to follow up visit unless specifically addressed in assesment and plan.    Interpretation of PHQ-9 Total Score   Score Severity   1-4 No Depression   5-9 Mild Depression   10-14 Moderate Depression   15-19 Moderately Severe Depression   20-27 Severe Depression        2/25/2025    10:36 AM    ELICIA-7 ANXIETY SCALE FLOWSHEET   Feeling nervous, anxious, or on edge 1   Not being able to stop or control worrying 1   Worrying too much about different things 3   Trouble relaxing 2   Being so restless that it is hard to sit still 3   Becoming easily annoyed or irritable 1   Feeling afraid as if something awful might happen 2   ELICIA-7 Total Score 13       Interpretation of ELICIA-7 Total Score   Score Severity   0-4 Minimal Anxiety  5-9 Mild Anxiety   10-14 Moderate Anxiety  15-21 Severy Anxiety          Assessment & Plan:     Problem List Items Addressed This Visit       Depression, major, recurrent, moderate (HCC)    Relevant Orders    CBC WITHOUT DIFFERENTIAL (Completed)    TSH WITH REFLEX TO FT4 (Completed)    TSH+FREE T4    VITAMIN D,25 HYDROXY (DEFICIENCY) (Completed)    Comp Metabolic Panel (Completed)     Other Visit Diagnoses         Screening for endocrine, nutritional, metabolic and immunity disorder          Need for vaccination        Relevant Orders    INFLUENZA VACCINE TRI INJ (PF) (Completed)      Other fatigue        Relevant Orders    CARMEN ANTIBODY WITH REFLEX    CRP QUANTITIVE (NON-CARDIAC) (Completed)    IRON (Completed)    Sed Rate (Completed)    FERRITIN (Completed)    Testosterone, Free & Total, Adult Male (w/SHBG) (Completed)              Assessment & Plan  1. Recurrent MDD: Chronic and ongoing Stopped antidepressants, exploring other treatments.  - Ordered labs: vitamin D, thyroid function tests, blood counts, and testosterone levels.  - Discussed TMS therapy for major depressive disorder, OCD, and  anxiety.  - Advised counseling or therapy, including virtual visits.    2. Suspected erythromelalgia: Undiagnosed concern with uncertain prognosis.   Reports burning and swelling in hands and feet after warm water exposure.  - Advised cold packs or cold water for symptom relief.  - Referral to specialist based on lab results and further evaluation.    Follow-up  - Virtual visit in a few weeks to review lab results and discuss further management.      Health Maintenance: Orders placed as applicable to patient     Objective:     Exam:  Objective:  Vitals:    02/25/25 1038   BP: 108/78   Pulse: 92   Resp: (!) 7   Temp: 36.3 °C (97.4 °F)   SpO2: 96%     Physical Exam  Physical Exam    Constitutional:       Appearance: Normal appearance.   Eyes:      Extraocular Movements: Extraocular movements intact.   Pulmonary:      Effort: Pulmonary effort is normal.   Neurological:      General: No focal deficit present.      Mental Status: She is alert and oriented to person, place, and time.   Psychiatric:         Mood and Affect: Mood normal.         Behavior: Behavior normal.       Return in about 6 weeks (around 4/8/2025) for lab follow up.    Please note that this dictation was created using voice recognition software. I have made every reasonable attempt to correct obvious errors, but I expect that there are errors of grammar and possibly content that I did not discover before finalizing the note.

## 2025-02-27 LAB
NUCLEAR IGG SER QL IA: NORMAL
SHBG SERPL-SCNC: 43 NMOL/L (ref 17–56)
TESTOST FREE MFR SERPL: 1.6 % (ref 1.6–2.9)
TESTOST FREE SERPL-MCNC: 71 PG/ML (ref 47–244)
TESTOST SERPL-MCNC: 441 NG/DL (ref 300–1080)

## 2025-03-05 ENCOUNTER — RESULTS FOLLOW-UP (OUTPATIENT)
Dept: MEDICAL GROUP | Facility: PHYSICIAN GROUP | Age: 25
End: 2025-03-05

## 2025-04-23 ENCOUNTER — TELEMEDICINE (OUTPATIENT)
Dept: MEDICAL GROUP | Facility: PHYSICIAN GROUP | Age: 25
End: 2025-04-23
Payer: COMMERCIAL

## 2025-04-23 VITALS — HEIGHT: 72 IN | WEIGHT: 145 LBS | BODY MASS INDEX: 19.64 KG/M2

## 2025-04-23 DIAGNOSIS — M25.511 ACUTE PAIN OF RIGHT SHOULDER: ICD-10-CM

## 2025-04-23 DIAGNOSIS — F33.1 DEPRESSION, MAJOR, RECURRENT, MODERATE (HCC): ICD-10-CM

## 2025-04-23 DIAGNOSIS — M54.50 CHRONIC BILATERAL LOW BACK PAIN WITHOUT SCIATICA: ICD-10-CM

## 2025-04-23 DIAGNOSIS — E55.9 VITAMIN D DEFICIENCY: ICD-10-CM

## 2025-04-23 DIAGNOSIS — G89.29 CHRONIC BILATERAL LOW BACK PAIN WITHOUT SCIATICA: ICD-10-CM

## 2025-04-23 ASSESSMENT — FIBROSIS 4 INDEX: FIB4 SCORE: 0.51

## 2025-04-28 NOTE — PROGRESS NOTES
Verbal consent was acquired by the patient to use [employer] ambient listening note generation during this visit     Virtual Visit: Established Patient   This visit was conducted via Teams using secure and encrypted videoconferencing technology. The patient was in a private location in the [facility].    The patient's identity was confirmed and verbal consent was obtained for this virtual visit.    Subjective:     HPI:   History of Present Illness  The patient presents for a follow-up regarding laboratory results. The patient has a history of recurrent major depressive disorder, prompting interest in exploring alternative treatments and obtaining baseline laboratory tests to rule out vitamin deficiencies. The discussion included the consideration of transcranial magnetic stimulation (TMS) therapy versus counseling or psychotherapy. Concerns were raised about suspected erythromelalgia, characterized by burning and swelling in the hands and feet following water exposure, with cold packs or cold water suggested for symptom relief.    The patient reports overall improvement in condition since the last visit, although a recent episode of low mood was noted. There is uncertainty regarding whether symptoms are attributable to life changes or neurological issues. The patient expresses consideration of TMS therapy but remains apprehensive and desires further research before proceeding. The patient is open to a formal psychological evaluation, contingent on insurance coverage. The patient has experienced adverse reactions to several medications in the past, with citalopram having the least noticeable side effects. Resuming citalopram is considered if necessary.    The patient reports increased pain in the right shoulder and lower back, particularly during work, despite the job not being physically demanding. Occasional overreaching and difficulty in distinguishing between incorrect body movements and potential health  issues are noted. The patient reports numbness in the shoulder, which worsens with prolonged work. Lower back pain is localized to the midline and does not radiate to the lower extremities. The pain is described as sharp discomfort, somewhat alleviated by rest. Morning stiffness is experienced, but no hip pain is reported. The patient acknowledges poor posture at the desk and expresses openness to short-term physical therapy.    FAMILY HISTORY  His mother and sister both have back problems and fibromyalgia.        Assessment & Plan:     1. Depression, major, recurrent, moderate (HCC)  Referral to Behavioral Health      2. Vitamin D deficiency        3. Acute pain of right shoulder  Referral to Physical Therapy      4. Chronic bilateral low back pain without sciatica  Referral to Physical Therapy    DX-LUMBAR SPINE-2 OR 3 VIEWS          Assessment & Plan  1. Recurrent major depressive disorder: Stable. Labs show normal CBC, electrolytes, kidney function, total protein, LFTs, and TSH. Triglycerides slightly elevated; vitamin D low; testosterone lower end of normal.  - Explore alternative treatments, including TMS therapy, after further research.  - Referral for formal psychological evaluation, ensuring provider accepts insurance.  - Referral to a multidisciplinary team comprising psychiatry and psychology, ensuring provider accepts insurance.    2. Right shoulder pain: Chronic and ongoing   - Referral for physical therapy to evaluate posture and movements.    3. Lower back pain: Chronic and ongoing    Despite negative inflammatory markers, ankylosing spondylitis cannot be ruled out without imaging.  - Referral for physical therapy to evaluate posture and movements.  - Order x-ray of the lower back, which can be scheduled at convenience.    Follow-up  - Keep an eye out in MyChart for referrals, which will take about a week to complete.    Health Maintenance: Completed    Objective:     Exam:  Ht 1.829 m (6')   Wt 65.8  kg (145 lb) Comment: Pt reported, virtual  BMI 19.67 kg/m²  Body mass index is 19.67 kg/m².    Physical Exam:  Constitutional: Alert, no distress, well-groomed.  Skin: No rashes in visible areas.  Eye: Round. Conjunctiva clear, lids normal. No icterus.   ENMT: Lips pink without lesions, good dentition, moist mucous membranes. Phonation normal.  Neck: No masses, no thyromegaly. Moves freely without pain.  Respiratory: Unlabored respiratory effort, no cough or audible wheeze  Psych: Alert and oriented x3, normal affect and mood.     Results  Labs   - CBC: Normal   - ESR/CRP: Not elevated   - Electrolytes: Normal   - Creatinine/BUN: Normal   - Total protein/albumin: Normal   - LFTs: Normal   - Triglycerides: Slightly elevated   - Cholesterol: Normal   - Vitamin D: Low   - TSH: Normal   - Testosterone: Lower end of normal    No follow-ups on file.    Please note that this dictation was created using voice recognition software. I have made every reasonable attempt to correct obvious errors, but I expect that there are errors of grammar and possibly content that I did not discover before finalizing the note.

## 2025-04-30 NOTE — Clinical Note
REFERRAL APPROVAL NOTICE         Sent on April 30, 2025                   Ugo Teresa Reji  [address]  [place] [address]                   Dear Mr. Mendoza,    After a careful review of the medical information and benefit coverage, [facility] has processed your referral. See below for additional details.    If applicable, you must be actively enrolled with your insurance for coverage of the authorized service. If you have any questions regarding your coverage, please contact your insurance directly.    REFERRAL INFORMATION   Referral #:  [id number]  Referred-To Provider    Referred-By Provider:  Behavioral Health    BIANCA Khan   [facility]      [address]  [facility] [address]  [phone] [address]  [id number]  [facility] [address]  [phone]    Referral Start Date:  04/23/2025  Referral End Date:   04/23/2026             SCHEDULING  If you do not already have an appointment, please call [phone] to make an appointment.     MORE INFORMATION  If you do not already have a [employer] account, sign up at: [email or web address]  You can access your medical information, make appointments, see lab results, billing information, and more.  If you have questions regarding this referral, please contact  the [facility] Referrals department at:             [phone]. Monday - Friday 8:00AM - 5:00PM.     Sincerely,    [facility]

## 2025-04-30 NOTE — Clinical Note
REFERRAL APPROVAL NOTICE         Sent on April 30, 2025                   Ugo Aneudy Pintooresdavid Mendoza  [address]  [facility] [address]                   Dear Mr. Mendoza,    After a careful review of the medical information and benefit coverage, [facility] has processed your referral. See below for additional details.    If applicable, you must be actively enrolled with your insurance for coverage of the authorized service. If you have any questions regarding your coverage, please contact your insurance directly.    REFERRAL INFORMATION   Referral #:  [id number]  Referred-To Department    Referred-By Provider:  Physical Therapy    BIANCA Khan   [facility]      [address]  [facility] [id number] [phone] [address]  [facility] [address]  [phone]    Referral Start Date:  04/23/2025  Referral End Date:   04/23/2026             SCHEDULING  If you do not already have an appointment, please call [phone] to make an appointment.     MORE INFORMATION  If you do not already have a [employer] account, sign up at: [email or web address]  You can access your medical information, make appointments, see lab results, billing information, and more.  If you have questions regarding this referral, please contact  the [facility] Referrals department at:             [phone]. Monday - Friday 8:00AM - 5:00PM.     Sincerely,    [facility]

## 2025-05-28 DIAGNOSIS — K21.9 GASTROESOPHAGEAL REFLUX DISEASE WITHOUT ESOPHAGITIS: ICD-10-CM

## 2025-05-29 RX ORDER — OMEPRAZOLE 20 MG/1
20 CAPSULE, DELAYED RELEASE ORAL
Qty: 60 CAPSULE | Refills: 0 | Status: SHIPPED | OUTPATIENT
Start: 2025-05-29

## 2025-08-05 ENCOUNTER — OFFICE VISIT (OUTPATIENT)
Dept: MEDICAL GROUP | Facility: PHYSICIAN GROUP | Age: 25
End: 2025-08-05
Payer: COMMERCIAL

## 2025-08-05 VITALS
OXYGEN SATURATION: 97 % | WEIGHT: 147 LBS | TEMPERATURE: 98.3 F | RESPIRATION RATE: 16 BRPM | SYSTOLIC BLOOD PRESSURE: 118 MMHG | BODY MASS INDEX: 19.91 KG/M2 | HEIGHT: 72 IN | DIASTOLIC BLOOD PRESSURE: 70 MMHG | HEART RATE: 76 BPM

## 2025-08-05 DIAGNOSIS — F33.1 DEPRESSION, MAJOR, RECURRENT, MODERATE (HCC): ICD-10-CM

## 2025-08-05 DIAGNOSIS — G47.9 SLEEP DISTURBANCE: Primary | ICD-10-CM

## 2025-08-05 PROCEDURE — 3078F DIAST BP <80 MM HG: CPT | Performed by: FAMILY MEDICINE

## 2025-08-05 PROCEDURE — 3074F SYST BP LT 130 MM HG: CPT | Performed by: FAMILY MEDICINE

## 2025-08-05 PROCEDURE — 99213 OFFICE O/P EST LOW 20 MIN: CPT | Performed by: FAMILY MEDICINE

## 2025-08-05 RX ORDER — KETOCONAZOLE 20 MG/ML
SHAMPOO, SUSPENSION TOPICAL
COMMUNITY
Start: 2025-05-27

## 2025-08-05 RX ORDER — TRETINOIN 0.25 MG/G
CREAM TOPICAL
COMMUNITY
Start: 2025-05-27

## 2025-08-05 ASSESSMENT — FIBROSIS 4 INDEX: FIB4 SCORE: 0.51

## 2025-08-06 ENCOUNTER — PHYSICAL THERAPY (OUTPATIENT)
Dept: PHYSICAL THERAPY | Facility: REHABILITATION | Age: 25
End: 2025-08-06
Payer: COMMERCIAL

## 2025-08-06 DIAGNOSIS — M54.50 CHRONIC BILATERAL LOW BACK PAIN WITHOUT SCIATICA: ICD-10-CM

## 2025-08-06 DIAGNOSIS — M25.511 ACUTE PAIN OF RIGHT SHOULDER: Primary | ICD-10-CM

## 2025-08-06 DIAGNOSIS — G89.29 CHRONIC BILATERAL LOW BACK PAIN WITHOUT SCIATICA: ICD-10-CM

## 2025-08-06 PROCEDURE — 999999 HB NO CHARGE
